# Patient Record
Sex: MALE | Race: WHITE
[De-identification: names, ages, dates, MRNs, and addresses within clinical notes are randomized per-mention and may not be internally consistent; named-entity substitution may affect disease eponyms.]

---

## 2018-04-06 ENCOUNTER — HOSPITAL ENCOUNTER (EMERGENCY)
Dept: HOSPITAL 25 - ED | Age: 63
Discharge: HOME | End: 2018-04-06
Payer: COMMERCIAL

## 2018-04-06 VITALS — DIASTOLIC BLOOD PRESSURE: 71 MMHG | SYSTOLIC BLOOD PRESSURE: 119 MMHG

## 2018-04-06 DIAGNOSIS — N20.0: Primary | ICD-10-CM

## 2018-04-06 DIAGNOSIS — Z87.442: ICD-10-CM

## 2018-04-06 DIAGNOSIS — K80.20: ICD-10-CM

## 2018-04-06 DIAGNOSIS — E27.9: ICD-10-CM

## 2018-04-06 LAB
BASOPHILS # BLD AUTO: 0.1 10^3/UL (ref 0–0.2)
EOSINOPHIL # BLD AUTO: 0 10^3/UL (ref 0–0.6)
HCT VFR BLD AUTO: 42 % (ref 42–52)
HGB BLD-MCNC: 14.2 G/DL (ref 14–18)
LYMPHOCYTES # BLD AUTO: 0.8 10^3/UL (ref 1–4.8)
MCH RBC QN AUTO: 29 PG (ref 27–31)
MCHC RBC AUTO-ENTMCNC: 34 G/DL (ref 31–36)
MCV RBC AUTO: 86 FL (ref 80–94)
MONOCYTES # BLD AUTO: 0.4 10^3/UL (ref 0–0.8)
NEUTROPHILS # BLD AUTO: 7.6 10^3/UL (ref 1.5–7.7)
NRBC # BLD AUTO: 0 10^3/UL
NRBC BLD QL AUTO: 0
PLATELET # BLD AUTO: 156 10^3/UL (ref 150–450)
RBC # BLD AUTO: 4.87 10^6/UL (ref 4–5.4)
WBC # BLD AUTO: 9 10^3/UL (ref 3.5–10.8)

## 2018-04-06 PROCEDURE — 99284 EMERGENCY DEPT VISIT MOD MDM: CPT

## 2018-04-06 PROCEDURE — 86140 C-REACTIVE PROTEIN: CPT

## 2018-04-06 PROCEDURE — 83735 ASSAY OF MAGNESIUM: CPT

## 2018-04-06 PROCEDURE — 80053 COMPREHEN METABOLIC PANEL: CPT

## 2018-04-06 PROCEDURE — 96360 HYDRATION IV INFUSION INIT: CPT

## 2018-04-06 PROCEDURE — 96376 TX/PRO/DX INJ SAME DRUG ADON: CPT

## 2018-04-06 PROCEDURE — 96375 TX/PRO/DX INJ NEW DRUG ADDON: CPT

## 2018-04-06 PROCEDURE — 85025 COMPLETE CBC W/AUTO DIFF WBC: CPT

## 2018-04-06 PROCEDURE — 36415 COLL VENOUS BLD VENIPUNCTURE: CPT

## 2018-04-06 PROCEDURE — 74176 CT ABD & PELVIS W/O CONTRAST: CPT

## 2018-04-06 PROCEDURE — 96374 THER/PROPH/DIAG INJ IV PUSH: CPT

## 2018-04-06 NOTE — ED
Yaquelin GRULLON Rebecca, scribed for Olga Hickman MD on 04/06/18 at 0635 .





Abdominal Pain/Male





- HPI Summary


HPI Summary: 


Pt is a 63 y/o M who presents to ED c/o abdominal pain. Sx began at 0200 this 

morning and is in the left flank without radiation. Pain is currently severe, 

ranked 10/10 and described as feeling "like it's inside." Treated with 2 Advil 

at 0230. Sx aggravated and alleviated by nothing. Additionally c/o N/V. Denies 

fever and dysuria. PMHx kidney stones 6 years ago and current sx feel similar. 








- History of Current Complaint


Chief Complaint: EDFlankPain


Stated Complaint: FLANK PAIN


Hx Obtained From: Patient


Onset/Duration: Lasting Hours, Still Present


Severity Currently: Severe


Pain Intensity: 10


Pain Scale Used: 0-10 Numeric


Location: Flank - Left


Radiates: No


Aggravating Factor(s): Nothing


Alleviating Factor(s): Nothing


Associated Signs And Symptoms: Positive: Nausea, Vomiting.  Negative: Fever





- Allergies/Home Medications


Allergies/Adverse Reactions: 


 Allergies











Allergy/AdvReac Type Severity Reaction Status Date / Time


 


No Known Allergies Allergy   Verified 04/06/18 06:04














PMH/Surg Hx/FS Hx/Imm Hx


Endocrine/Hematology History: 


   Denies: Hx Diabetes


Cardiovascular History: 


   Denies: Hx Hypertension


 History: Reports: Hx Kidney Stones


Infectious Disease History: No


Infectious Disease History: 


   Denies: Traveled Outside the [place] in Last 30 Days





- Family History


Known Family History: 


   Negative: Hypertension, Diabetes





- Social History


Alcohol Use: Rare


Substance Use Type: Reports: None


Smoking Status (MU): Never Smoked Tobacco





Review of Systems


Negative: Fever


Positive: Abdominal Pain - Left flank pain, Vomiting, Nausea


Negative: dysuria


All Other Systems Reviewed And Are Negative: Yes





Physical Exam





- Summary


Physical Exam Summary: 


VITAL SIGNS: Reviewed.


GENERAL: ~Patient is a well-developed and nourished male who is lying 

comfortable in the stretcher. Patient is not in any acute respiratory distress.


HEAD AND FACE: No signs of trauma. No ecchymosis, hematomas or skull 

depressions. No sinus tenderness.


EYES: PERRLA, EOMI x 2, No injected conjunctiva, no nystagmus.


EARS: Hearing grossly intact. Ear canals and tympanic membranes are within 

normal limits.


MOUTH: Oropharynx within normal limits.


NECK: Supple, trachea is midline, no adenopathy, no JVD, no carotid bruit, no c-

spine tenderness, neck with full ROM.


CHEST: Symmetric, no tenderness at palpation


LUNGS: Clear to auscultation bilaterally. No wheezing or crackles.


CVS: Regular rate and rhythm, S1 and S2 present, no murmurs or gallops 

appreciated.


ABDOMEN: Soft, non-tender. No signs of distention. No rebound no guarding, and 

no masses palpated. Bowel sounds are normal.


EXTREMITIES: FROM in all major joints, no edema, no cyanosis or clubbing. No 

CVA tenderness. 


NEURO: Alert and oriented x 3. No acute neurological deficits. Speech is normal 

and follows commands.


SKIN: Dry and warm





Triage Information Reviewed: Yes


Vital Signs On Initial Exam: 


 Initial Vitals











Temp Pulse Resp BP Pulse Ox


 


 97.1 F   67   16   153/91   98 


 


 04/06/18 06:02  04/06/18 06:02  04/06/18 06:02  04/06/18 06:02  04/06/18 06:02











Vital Signs Reviewed: Yes





Diagnostics





- Vital Signs


 Vital Signs











  Temp Pulse Resp BP Pulse Ox


 


 04/06/18 06:02  97.1 F  67  16  153/91  98














- Laboratory


Lab Statement: Any lab studies that have been ordered have been reviewed, and 

results considered in the medical decision making process.





Abdominal Pain Fem Course/Dx





- Course


Assessment/Plan: Pt is a 63 y/o M who presents to ED c/o severe left flank pain 

since 0200 this morning, currently severe, ranked 10/10 and described as 

feeling "like it's inside." Treated with 2 Advil at 0230. Additionally c/o N/V. 

Denies fever and dysuria. PMHx kidney stones 6 years ago and current sx feel 

similar. In the ED course, pt received  toradol, morphine, zofran, and fluids. 

Pt will be signed out to Dr. Solano, pending dispo, awaiting CT Abd/Pel.





- Diagnoses


Provider Diagnoses: 


 Left flank pain








Discharge





- Sign-Out/Discharge


Documenting (check all that apply): Sign-Out Patient


Signing out patient TO: Jesus Solano - CT Abd/Pel





- Discharge Plan


Condition: Stable


Referrals: 


No Primary Care Phys,NOPCP [Primary Care Provider] - 





The documentation as recorded by the Yaquelin vivas Rebecca accurately 

reflects the service I personally performed and the decisions made by me, Olga Hickman MD.

## 2018-04-06 NOTE — RAD
INDICATION:  Left flank abdominal pain.



COMPARISON:  There are no prior studies available for comparison.



TECHNIQUE: A CT scan of the abdomen and pelvis was performed without intravenous or oral

contrast. Contiguous axial sections were obtained from the lung bases through the

symphysis pubis. Images were reconstructed in the coronal and sagittal planes.



FINDINGS:  Images through the lung bases demonstrate a small infiltrate in the right lower

lobe.  No pleural effusion is present.



The liver and spleen are normal in size without significant focal abnormality on this

noncontrast study. There is increased density present dependently within the gallbladder

most consistent with gallstones. The gallbladder is not distended. No gallbladder wall

thickening is seen. The pancreas appears to be within normal limits.



There is a 0.9 cm left adrenal nodule. The right adrenal gland appears to be within normal

limits. The right kidney appears normal. The left kidney appears enlarged. There is

perinephric stranding around the left kidney. There is dilatation of the left renal

calyces, pelvis and ureter to the level of the ureterovesical junction. There is a 3 mm

calculus in that region. These findings cause moderate hydronephrosis. The patient appears

to be status post prostatectomy.



The aorta is normal in caliber without significant calcific plaque.



No significant enlarged retroperitoneal lymph nodes are seen.



The stomach, small and large bowel appear nondistended.  The appendix is within normal

limits.  There is moderate descending and sigmoid diverticulosis. There is no evidence for

diverticulitis or colitis.



No free intraperitoneal air or fluid is seen.



No significant focal osseous abnormality is seen.



IMPRESSION:  

1. THERE IS A 3 MM CALCULUS AT THE LEFT URETEROVESICAL JUNCTION CAUSING MODERATE

HYDRONEPHROSIS.

2. CHOLELITHIASIS WITHOUT EVIDENCE FOR ACUTE CHOLECYSTITIS.

3. STATUS POST PROSTATECTOMY.

4. SMALL LEFT ADRENAL NODULE. RECOMMEND A FOLLOW-UP NONCONTRAST CT OF THE ABDOMEN IN 6

MONTHS TIME TO DEMONSTRATE STABILITY.

## 2018-04-07 NOTE — ED
Jose Armando GRULLON Angela, scribed for Jesus Solano MD on 04/06/18 at 0731 .





Progress





- Progress Note


Progress Note: 





This pt was signed out by Dr. Hickman, pending disposition, awaiting CT abdomen/

pelvis.





Pt is a 61 y/o male presenting to [facility] c/o left flank pain since 02:00 this 

morning. He has hx of kidney stones. Pt reports his pain today feels like 

previous kidney stones. 


On re-eval, pt notes his pain has decreased, rating it 5/10 in severity.





Physical Exam:


VITAL SIGNS: Reviewed. 


GENERAL: Patient is a well-developed and nourished male who is lying 

comfortable in the stretcher.  Patient is not in any acute respiratory 

distress. 


HEAD AND FACE: Normocephalic and atraumatic. 


EYES: PERRLA, EOMI x 2, No injected conjunctiva.


EARS: Hearing grossly intact. Ear canals and tympanic membranes are WNL.


MOUTH: Oropharynx within normal limits. 


NECK: Supple, trachea is midline, no adenopathy, no JVD.


CHEST: Symmetric, no tenderness at palpation 


LUNGS: Clear to auscultation bilaterally. No wheezing or crackles.


CVS: RRR, S1 and S2 present, no murmurs or gallops appreciated. 


ABDOMEN: Soft. Right flank tenderness. Right costovertebral angle tenderness. 

No signs of distention. Positive bowel sounds. No rebound no guarding, and no 

masses palpated. No abdominal bruit or pulsations. 


EXTREMITIES: FROM in all major joints, no edema, no cyanosis or clubbing.


NEURO: Alert and oriented x 3. No acute neurological deficits. Speech is normal.


SKIN: Dry and warm








Abdomen/Pelvis CT, as read by radiologist


IMPRESSION:


1. There is a 3 mm calculus at the left ureterovesical junction causing 

moderate hydronephrosis.


2. Cholelithiasis without evidence for acute cholecystitis.


3. Status post prostatectomy.


4. Small left adrenal nodule recommend a follow-up noncontrast CT of the 

abdomen in 6 months time to demonstrate stability.





Dr. Solano has reviewed this radiology report. 





Re-Evaluation





- Re-Evaluation


  ** First Eval


Re-Evaluation Time: 07:15


Comment: He is still awaiting CT abdomen/pelvis. Pt notes his pain has decreased

, rating it 5/10 in severity.





  ** Second Eval


Re-Evaluation Time: 08:16


Comment: I reviewed the CT results with the pt. Pt will be discharged to home.





Course/Dx





- Course


Course Of Treatment: This pt was signed out by Dr. Hickman to follow up on the 

abdomen/pelvis CT.  Test results without any significant abnormalities except 

for glucose of 139.  CT abdomen/pelvis shows 1. There is a 3 mm calculus at the 

left ureterovesical junction causing moderate hydronephrosis. 2. Cholelithiasis 

without evidence for acute cholecystitis.  3. Status post prostatectomy. 4. 

Small left adrenal nodule recommend a follow-up noncontrast CT of the abdomen 

in 6 months time to demonstrate stability.  In the ED course the pt was given 

morphine and Flomax. After these medications, the pt reports his pain has 

decreased.  Therefore he will be discharged to home with follow up from his 

PCP. I discussed all the findings and test results with the patient. Pt was 

instructed to have a follow up CT in 6 months for the small left adrenal nodule 

seen in the CT today. All questions were answered to patient satisfaction. 

There were no further complaints or concerns.  Pt was discharged home with a 

prescription for Percocet.  He is instructed to return to the ED for any 

worsening or new symptoms.  Pt is hemodynamically stable, alert and oriented x3.





- Diagnoses


Provider Diagnoses: 


 Kidney stone








Discharge





- Sign-Out/Discharge


Documenting (check all that apply): Discharge - discharge to home, Receiving 

Sign-Out


Receiving patient FROM: Olga Hickman





- Discharge Plan


Condition: Stable


Disposition: HOME


Prescriptions: 


oxyCODONE/Acetamin 5/325 MG* [Percocet 5/325 TAB*] 1 tab PO Q6H PRN #112 tab 

MDD 4


 PRN Reason: Pain


Patient Education Materials:  Kidney Stones (ED)


Referrals: 


[facility] [place] PHYSICIAN REFERRAL [Outside]


Additional Instructions: 


Please follow up with your primary care provider.





Recommend follow up CT in 6 months for the small left adrenal nodule seen in 

your CT today.





RETURN TO THE ED FOR ANY NEW OR WORSENING SYMPTOMS.





The documentation as recorded by the Jose Armando vivas Angela accurately reflects 

the service I personally performed and the decisions made by me, Jesus Solano MD.